# Patient Record
(demographics unavailable — no encounter records)

---
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
## 2024-12-11 NOTE — PHYSICAL EXAM
[Antalgic] : antalgic [Mcgrath's Sign] : negative Mcgrath's sign [UE/LE] : Sensory: Intact in bilateral upper & lower extremities [Normal DTR Reflexes] : DTR reflexes normal [Normal Proprioception] : sensation intact for proprioception [Normal] : Oriented to person, place, and time, insight and judgement were intact and the affect was normal [de-identified] : Difficult to elicit full motor exam given patient's hesitancy due to reproduced pain [de-identified] : AP lateral cervical and lumbar spine x-ray 12/9/2024 PACS Maintenance of coronal and sagittal alignment with minimal degenerative change.  No evidence of listhesis

## 2024-12-11 NOTE — PHYSICAL EXAM
[Antalgic] : antalgic [Mcgrath's Sign] : negative Mcgrath's sign [UE/LE] : Sensory: Intact in bilateral upper & lower extremities [Normal DTR Reflexes] : DTR reflexes normal [Normal Proprioception] : sensation intact for proprioception [Normal] : Oriented to person, place, and time, insight and judgement were intact and the affect was normal [de-identified] : Difficult to elicit full motor exam given patient's hesitancy due to reproduced pain [de-identified] : AP lateral cervical and lumbar spine x-ray 12/9/2024 PACS Maintenance of coronal and sagittal alignment with minimal degenerative change.  No evidence of listhesis

## 2024-12-11 NOTE — ASSESSMENT
[FreeTextEntry1] : 44-year-old female with chronic neck pain low back pain upper and lower radicular symptoms as well as difficulty with balance coordination and sensation in multiple areas

## 2024-12-11 NOTE — DISCUSSION/SUMMARY
[de-identified] : I reviewed the available imaging the patient as well as my impression of her symptoms.  Presently we do not have access to the imaging and diagnostic treatment records for spinal conditions.  Given her ongoing severe symptoms I recommended MRIs of the cervical thoracic and lumbar spine to evaluate for structural cause of radiculopathy or possible myelopathy.  Given the more central diagnoses and symptoms that she experienced have also recommended a referral to our neurology colleagues for evaluation.  Once imaging can be completed we will discuss appropriate next steps as needed  I have spent greater than 60 minutes preparing to see the patient, collecting relevant history, performing a thorough history and physical examination, counseling the patient regarding my findings ordering the appropriate therapies and tests, communicating with other relevant healthcare professionals, documenting my encounter and coordinating care.